# Patient Record
(demographics unavailable — no encounter records)

---

## 2025-06-27 NOTE — HISTORY OF PRESENT ILLNESS
[FreeTextEntry1] : 49 yo M for initial consultation HTL no PSH NKDA not a smoker father had prostate cancer knows his PSA is tested routinely and has been stable not sure regarding the exact value scheduled for next routine visit with labs in 7/19/2025  no LUTS  also mentions some mild ED for the past few months never tried any medical treatment discussed viagra and cilais discussed daily use versus use it as needed interested in trying low dose viagra discussed how to use the medication and possible SE including headache, flushing, stuffy nose, and color perception changes  thinks he had testosterone tested already, will review records   plan: - request records - new PSA - sildenafil 25 mg - TTM in 3-4 weeks to review - if normal, will only continue follow up with PCP

## 2025-06-27 NOTE — ASSESSMENT
[FreeTextEntry1] :  plan: - request records - new PSA - sildenafil 25 mg - TTM in 3-4 weeks to review - if normal, will only continue follow up with PCP